# Patient Record
(demographics unavailable — no encounter records)

---

## 2024-11-05 NOTE — DATA REVIEWED
[FreeTextEntry1] : GAEL - TBI - diminished TBI bl  Left lower extremity arterial duplex - SFA stenosis, runoff via peroneal

## 2024-11-05 NOTE — ASSESSMENT
[FreeTextEntry1] : 91 yo female with a nonhealing wound to the left lower leg. The wound has not healed despite local wound care. The patient has no palpable popliteal or pedal pulses on exam. She underwent arterial testing which demonstrated an SFA stenosis and diminished TBI (GAEL could not be performed secondary to location of the wound). I believe she would benefit from an angiogram and possible intervention. The risks and benefits were discussed with the patient and her daughter, including infection, bleeding and renal insufficiency. I also think she would benefit from increased compression and she was instructed to wear an ace wrap daily.  Will schedule for procedure. Continue current workup.

## 2024-11-05 NOTE — PHYSICAL EXAM
[JVD] : no jugular venous distention  [Normal Breath Sounds] : Normal breath sounds [Normal Rate and Rhythm] : normal rate and rhythm [2+] : left 2+ [1+] : right 1+ [0] : left 0 [Ankle Swelling (On Exam)] : present [Ankle Swelling On The Left] : of the left ankle [Ankle Swelling On The Right] : mild [Alert] : alert [Oriented to Person] : oriented to person [Oriented to Place] : oriented to place [Oriented to Time] : oriented to time [de-identified] : Awake and Alert [de-identified] : medial malleolus ulcer - large defect [de-identified] : Appropriate

## 2024-11-05 NOTE — REASON FOR VISIT
[Initial Evaluation] : an initial evaluation [FreeTextEntry1] : nonhealing wound left lower extremity

## 2024-11-05 NOTE — HISTORY OF PRESENT ILLNESS
[FreeTextEntry1] : 91 yo female referred by Dr. Null for a nonhealing wound to the left lower extremity. The patient reports that she developed a wound to the left leg in August. She reports that she has been undergoing local wound care with wet to dry dressings and tubigrip compression at the [facility]. She reports that the wound is not progressing. She was referred for a vascular evaluation. She is on Coumadin for atrial fibrillation.

## 2024-12-16 NOTE — REVIEW OF SYSTEMS
[Lower Ext Edema] : lower extremity edema [Limb Swelling] : limb swelling [Skin Wound] : skin wound [Fever] : no fever [Chills] : no chills [Limb Pain] : no limb pain

## 2024-12-16 NOTE — END OF VISIT
[FreeTextEntry3] :  All medical record entries made by the katelinibe were at NITA hanson KENNETH, direction and personally dictated by me on 12/16/2024. I have reviewed the chart and agree that the record accurately reflects my personal performance of the history, physical exam, assessment, and plan. I have also personally directed, reviewed, and agreed with the chart.

## 2024-12-16 NOTE — ASSESSMENT
[FreeTextEntry1] : 93 yo female returns in follow up. She is s/p LLE angiogram and intervention on 12/6 for PAD with nonhealing wound. Patient is doing well post procedure. Her wound is now clean and granulating and is slowly improving   I am hopeful the wound would heal in the upcoming weeks. She will continue local wound care. I think she would benefit from increased compression, and she was instructed to wear an ace wrap daily.  Patient was instructed to continue to follow up with Dr. Null at the wound care clinic weekly.. She will follow up in 3 weeks for a  wound check. If the wound does not continue to improve she will require a repeat angiogram vs a bypass.   She requested to stop taking Aspirin secondary to   frequent bruising. Continue Coumadin.

## 2024-12-16 NOTE — HISTORY OF PRESENT ILLNESS
[FreeTextEntry1] : 91 yo female referred by Dr. Null for a nonhealing wound to the left lower extremity. The patient reports that she developed a wound to the left leg in August. She reports that she has been undergoing local wound care with wet to dry dressings and tubigrip compression at the [facility]. She reports that the wound is not progressing. She was referred for a vascular evaluation. She is on Coumadin for atrial fibrillation.  [de-identified] : 92 year old female returns in follow up. She is s/p LLE angiogram and intervention on 12/6 for PAD with nonhealing wound. I was unable to cross her occluded popliteal artery but did perform a PTA of her severely diseased SFA. Patient reports that she is doing well post procedure. She reports that the wound is now improving. She is undergoing local wound care with wet to dry dressings and tubigrip compression. Patient continues to have swelling of the LLE. Daughter reports patient gets frequent bruising. She presents for a post-op check  She is on Aspirin and Coumadin.

## 2024-12-16 NOTE — PHYSICAL EXAM
[Normal Breath Sounds] : Normal breath sounds [Normal Rate and Rhythm] : normal rate and rhythm [2+] : left 2+ [1+] : right 1+ [Ankle Swelling (On Exam)] : present [Ankle Swelling On The Left] : of the left ankle [Ankle Swelling On The Right] : mild [Alert] : alert [Oriented to Person] : oriented to person [Oriented to Place] : oriented to place [Oriented to Time] : oriented to time [0] : left 0 [JVD] : no jugular venous distention  [de-identified] : Awake and Alert [FreeTextEntry1] : monophasic peroneal signal [de-identified] : medial malleolus ulcer - improving - now granulating [de-identified] :  No gross motor or sensory deficits. [de-identified] : Appropriate

## 2025-01-13 NOTE — ASSESSMENT
[FreeTextEntry1] : 91 yo female returns in follow up. She is s/p LLE angiogram and intervention on 12/6 for PAD with nonhealing wound. Patient is doing well post procedure. Her wound continues to improve and is almost healed. She will continue local wound care. and compression therapy.  Patient was instructed to continue to follow up with Dr. Null at the wound care clinic. She will follow up with me if her wound deteriorates.    Continue Coumadin.

## 2025-01-13 NOTE — PHYSICAL EXAM
[JVD] : no jugular venous distention  [Normal Breath Sounds] : Normal breath sounds [Normal Rate and Rhythm] : normal rate and rhythm [2+] : left 2+ [0] : right 0 [1+] : right 1+ [Ankle Swelling (On Exam)] : not present [Ankle Swelling On The Left] : of the left ankle [Alert] : alert [Oriented to Person] : oriented to person [Oriented to Place] : oriented to place [Oriented to Time] : oriented to time [de-identified] : Awake and Alert [FreeTextEntry1] : monophasic peroneal and DP signal [de-identified] : medial malleolus ulcer - improving -almost healed [de-identified] : Appropriate [de-identified] :  No gross motor or sensory deficits. no

## 2025-01-13 NOTE — HISTORY OF PRESENT ILLNESS
[FreeTextEntry1] : 93 yo female referred by Dr. Null for a nonhealing wound to the left lower extremity. The patient reports that she developed a wound to the left leg in August. She reports that she has been undergoing local wound care with wet to dry dressings and tubigrip compression at the [facility]. She reports that the wound is not progressing. She was referred for a vascular evaluation. She is on Coumadin for atrial fibrillation.  [de-identified] : 92 year old female returns in follow up. She is s/p LLE angiogram and intervention on 12/6 for PAD for a nonhealing wound. I was unable to cross her occluded popliteal artery but did perform a PTA of her severely diseased SFA. Patient reports that she continues to do well post procedure. She reports that the wound is almost healed. She is undergoing local wound care with Maxorb and tubigrip compression.  She presents for a post-op check  She is on Coumadin.

## 2025-06-09 NOTE — END OF VISIT
[FreeTextEntry3] :  All medical record entries made by the katelinibe were at myNITA KENNETH, direction and personally dictated by me on 6/9/2025. I have reviewed the chart and agree that the record accurately reflects my personal performance of the history, physical exam, assessment, and plan. I have also personally directed, reviewed, and agreed with the chart.

## 2025-06-09 NOTE — ASSESSMENT
[FreeTextEntry1] : 92 yo female returns in follow up for a recurrent  nonhealing wound to the LLE. She developed a recurrent nonhealing wound to the left lower extremity with associated pain a month ago. She reports that the wound is not progressing despite local wound care. She underwent a lower extremity arterial duplex which demonstrated an SFA and popliteal occlusion. I believe she would benefit from a repeat angiogram and possible intervention. The risks and benefits were discussed with the patient and her daughter, including infection, bleeding and renal insufficiency. Will schedule for procedure. Continue current workup.   She will need to stop Coumadin 5 days prior to the procedure.

## 2025-06-09 NOTE — PHYSICAL EXAM
[Normal Breath Sounds] : Normal breath sounds [Normal Rate and Rhythm] : normal rate and rhythm [2+] : left 2+ [0] : right 0 [Ankle Swelling (On Exam)] : present [Ankle Swelling On The Right] : mild [Ankle Swelling On The Left] : of the left ankle [Alert] : alert [Oriented to Person] : oriented to person [Oriented to Place] : oriented to place [Oriented to Time] : oriented to time [JVD] : no jugular venous distention  [de-identified] : Awake and Alert [de-identified] : recurrent wound to the LLE-large defect, macerated, 75% granulating [de-identified] :  No gross motor or sensory deficits. [de-identified] : Appropriate

## 2025-06-09 NOTE — DATA REVIEWED
[FreeTextEntry1] : Arterial duplex- left- personally reviewed - common femoral and deep femoral arteries are patent. Distal femoral to popliteal artery appear occluded with just below knee reconstitution. The PT, peroneal, and anterior tibial arteries are highly calcified with low monophasic flow. The DP artery is patent.

## 2025-06-09 NOTE — HISTORY OF PRESENT ILLNESS
[FreeTextEntry1] : 93 yo female referred by Dr. Null for a nonhealing wound to the left lower extremity. The patient reports that she developed a wound to the left leg in August. She reports that she has been undergoing local wound care with wet to dry dressings and tubigrip compression at the [facility]. She reports that the wound is not progressing. She was referred for a vascular evaluation. She is on Coumadin for atrial fibrillation.  [de-identified] : 93 year old female returns for non-healing LLE wound. She is s/p LLE angiogram and intervention on 12/6 for PAD for a nonhealing wound. I was unable to cross her occluded popliteal artery but did perform a PTA of her severely diseased SFA. Her ulcer healed post procedure. Approximately 1 month ago she underwent a recurrent ulcer. She has been followed by Dr. Huerta with little improvement.  She reports significant pain associated with the ulcer. She was started on Gabapentin with minimal relief. She is s/p an arterial duplex and presents to discuss the results and additional treatment options.  She is on Coumadin for atrial fibrillation.

## 2025-06-09 NOTE — REVIEW OF SYSTEMS
Lot #: [id number] Lot #: [id number] [Skin Wound] : skin wound [Fever] : no fever [Chills] : no chills [Lower Ext Edema] : no lower extremity edema [Limb Pain] : limb pain [Limb Swelling] : no limb swelling

## 2025-07-09 NOTE — ASSESSMENT
[FreeTextEntry1] : 92 yo female returns in follow up for a recurrent  nonhealing wound to the E. She is now s/pa  left lower extremity  angioplasty and stenting of  the superficial femoral and popliteal artery. She has a biphasic peroneal doppler signal. The wound is smaller in size since the last visit. It is clean and granulating. The patient will follow up in 2 weeks for a wound check sooner if she develops a problem. Continue local wound care as per by Dr. Null. Continue Coumadin and aspirin.

## 2025-07-09 NOTE — REVIEW OF SYSTEMS
[Skin Wound] : skin wound [Fever] : no fever [Chills] : no chills [Lower Ext Edema] : no lower extremity edema [Limb Pain] : no limb pain [Limb Swelling] : no limb swelling

## 2025-07-09 NOTE — END OF VISIT
[FreeTextEntry3] :  All medical record entries made by the katelinibe were at myNITA KENNETH, direction and personally dictated by me on 7/9/2025. I have reviewed the chart and agree that the record accurately reflects my personal performance of the history, physical exam, assessment, and plan. I have also personally directed, reviewed, and agreed with the chart.

## 2025-07-09 NOTE — HISTORY OF PRESENT ILLNESS
[FreeTextEntry1] : 91 yo female referred by Dr. Null for a nonhealing wound to the left lower extremity. The patient reports that she developed a wound to the left leg in August. She reports that she has been undergoing local wound care with wet to dry dressings and tubigrip compression at the [facility]. She reports that the wound is not progressing. She was referred for a vascular evaluation. She is on Coumadin for atrial fibrillation.  [de-identified] : 93 year old female returns in follow up for a non-healing LLE wound. She is s/p LLE angiogram and intervention on 12/6 for PAD for a nonhealing wound. I was unable to cross her occluded popliteal artery but did perform a PTA of her severely diseased SFA. Her ulcer healed post procedure. She developed a new ulceration and is now s/p a left lower extremity angiogram, angioplasty and stenting of left superficial femoral and popliteal arteries. The patient states that the wound has decreased in size since the procedure.  Her pain has resolved. She is here for a post operative evaluation. She has resumed her Coumadin for atrial fibrillation and is on aspirin.

## 2025-07-09 NOTE — PHYSICAL EXAM
[Normal Breath Sounds] : Normal breath sounds [Normal Rate and Rhythm] : normal rate and rhythm [2+] : left 2+ [Ankle Swelling (On Exam)] : present [Ankle Swelling On The Left] : of the left ankle [Ankle Swelling On The Right] : mild [Alert] : alert [Oriented to Person] : oriented to person [Oriented to Place] : oriented to place [Oriented to Time] : oriented to time [JVD] : no jugular venous distention  [de-identified] : Awake and Alert [FreeTextEntry1] : biphasic peroneal signal [de-identified] : no right groin hematoma, recurrent wound to the LLE- clean and granulating, mild edema of the left foot.  [de-identified] :  No gross motor or sensory deficits. [de-identified] : Appropriate

## 2025-07-14 NOTE — HISTORY OF PRESENT ILLNESS
[de-identified] : ANITA TREVIÑO is a 93 year old female with a PMH of PAD, hyperlipidemia, asthma, atrial fibrillation, hypertension who presents to the office today at the request of Dr. Kraus for neurosurgical consultation due to T12 fracture.  The pain is characterized as aching in nature.  It started June 2025 while reaching to get in a Jeep. It is exacerbated by transitions and relieved by rest.  It radiates midline lower back initially now just off midline to left. There has been no known trauma precipitating the pain.  The patient denies numbness of extremities, weakness of extremities, bowel or bladder incontinence and gait disturbance.  She has tried pain medications including Tramadol 25 mg in the last month with relief.  She has undergone imaging in the form of CT lumbar spine which revealed acute compression deformity of T12. T11 compression deformity. At T11-T12, there is disc herniation suspected with moderate spinal stenosis and potentially related to prior T11 fracture.  (see detailed report below) She has been using the cane for one year.  Surg Hx: bladder surgery, tonsillectomy, angioplasty x2 with Dr. Kraus, megan in forearm   Meds:  amlodipine, benazepril, aspirin 81 mg, bisoprolol, plavix, crestor, losartan, montelukast, rosuvastatin, singulair, lasix, tramadol, Vitamin D   Allergies: PCN  Soc Hx: nonsmoker, one glass EtOH, lives alone

## 2025-07-14 NOTE — ASSESSMENT
[FreeTextEntry1] : ANITA TREVIÑO is a 93 year old female with a PMH of PAD, hyperlipidemia, asthma, hypertension who presents to the office today at the request of Dr. Kraus for neurosurgical consultation due to T12 fracture.  I reviewed her CT lumbar spine in the office today. There is extensive scoliosis seen, as well as a chronic appearing compression fracture of the T11 vertebral body. There is an acute fracture at T12 without any significant loss of vertebral body height, retropulsion, or kyphosis.  We will have her obtained standing x-rays for baseline imaging and have provided her with a prescription to obtain an LSO brace.  I will plan to see her back in 8 more weeks with repeat upright X-rays to follow the development of the fracture healing process and to ensure she maintains her overall global alignment.  We discussed the overall importance of prevention of further fractures including obtaining dedicated bone density studies and potential treatments based on results.  She will discuss this with her primary care doctor who can order the studies and treat based on results and endocrinology can be available as needed.  I have also provided her with a prescription for physical therapy as well as information regarding possible home physical therapy  I spent 60 minutes relative to this patient encounter.

## 2025-07-14 NOTE — HISTORY OF PRESENT ILLNESS
[de-identified] : ANITA TREVIÑO is a 93 year old female with a PMH of PAD, hyperlipidemia, asthma, atrial fibrillation, hypertension who presents to the office today at the request of Dr. Kraus for neurosurgical consultation due to T12 fracture.  The pain is characterized as aching in nature.  It started June 2025 while reaching to get in a Jeep. It is exacerbated by transitions and relieved by rest.  It radiates midline lower back initially now just off midline to left. There has been no known trauma precipitating the pain.  The patient denies numbness of extremities, weakness of extremities, bowel or bladder incontinence and gait disturbance.  She has tried pain medications including Tramadol 25 mg in the last month with relief.  She has undergone imaging in the form of CT lumbar spine which revealed acute compression deformity of T12. T11 compression deformity. At T11-T12, there is disc herniation suspected with moderate spinal stenosis and potentially related to prior T11 fracture.  (see detailed report below) She has been using the cane for one year.  Surg Hx: bladder surgery, tonsillectomy, angioplasty x2 with Dr. Kraus, megan in forearm   Meds:  amlodipine, benazepril, aspirin 81 mg, bisoprolol, plavix, crestor, losartan, montelukast, rosuvastatin, singulair, lasix, tramadol, Vitamin D   Allergies: PCN  Soc Hx: nonsmoker, one glass EtOH, lives alone

## 2025-07-14 NOTE — PHYSICAL EXAM
[General Appearance - Alert] : alert [General Appearance - In No Acute Distress] : in no acute distress [Fluency] : fluency intact [Comprehension] : comprehension intact [Cranial Nerves Optic (II)] : visual acuity intact bilaterally,  pupils equal round and reactive to light [Cranial Nerves Trigeminal (V)] : facial sensation intact symmetrically [Cranial Nerves Oculomotor (III)] : extraocular motion intact [Cranial Nerves Facial (VII)] : face symmetrical [Cranial Nerves Vestibulocochlear (VIII)] : hearing was intact bilaterally [Cranial Nerves Glossopharyngeal (IX)] : tongue and palate midline [Cranial Nerves Accessory (XI - Cranial And Spinal)] : head turning and shoulder shrug symmetric [Cranial Nerves Hypoglossal (XII)] : there was no tongue deviation with protrusion [Motor Strength] : muscle strength was normal in all four extremities [Sensation Tactile Decrease] : light touch was intact [Abnormal Walk] : normal gait [1+] : Ankle jerk left 1+ [FreeTextEntry8] : uses cane  [FreeTextEntry1] : lower extremity wound

## 2025-07-21 NOTE — HISTORY OF PRESENT ILLNESS
[FreeTextEntry1] : 91 yo female referred by Dr. Null for a nonhealing wound to the left lower extremity. The patient reports that she developed a wound to the left leg in August. She reports that she has been undergoing local wound care with wet to dry dressings and tubigrip compression at the [facility]. She reports that the wound is not progressing. She was referred for a vascular evaluation. She is on Coumadin for atrial fibrillation.  [de-identified] : 93 year old female returns in follow up for a non-healing LLE wound. She is s/p LLE angiogram and intervention on 12/6 for PAD for a nonhealing wound. I was unable to cross her occluded popliteal artery but did perform a PTA of her severely diseased SFA. Her ulcer healed post procedure. She developed a new ulceration and is now s/p a left lower extremity angiogram, angioplasty and stenting of left superficial femoral and popliteal arteries. The patient states that the wound has decreased in size since the procedure.  Her pain has resolved. She is here for a wound check. She continues to follow up with Dr. Huerta in the [facility] wound care clinic.  She has resumed her Coumadin for atrial fibrillation and is on aspirin.

## 2025-07-21 NOTE — END OF VISIT
[FreeTextEntry3] :  All medical record entries made by the katelinibe were at myNITA KENNETH, direction and personally dictated by me on 7/21/2025. I have reviewed the chart and agree that the record accurately reflects my personal performance of the history, physical exam, assessment, and plan. I have also personally directed, reviewed, and agreed with the chart.

## 2025-07-21 NOTE — PHYSICAL EXAM
[Normal Breath Sounds] : Normal breath sounds [Normal Rate and Rhythm] : normal rate and rhythm [Ankle Swelling On The Left] : of the left ankle [Alert] : alert [Oriented to Person] : oriented to person [Oriented to Place] : oriented to place [Oriented to Time] : oriented to time [JVD] : no jugular venous distention  [2+] : left 2+ [Ankle Swelling (On Exam)] : not present [de-identified] : Awake and Alert [FreeTextEntry1] : biphasic DP signal [de-identified] : recurrent wound to the LLE- clean and granulating - smaller in size, more superficial [de-identified] :  No gross motor or sensory deficits. [de-identified] : Appropriate

## 2025-07-21 NOTE — ASSESSMENT
[FreeTextEntry1] : 92 yo female returns in follow up for a recurrent nonhealing wound to the [facility]. She is now s/p a left lower extremity angioplasty and stenting of the superficial femoral and popliteal artery. She has a biphasic DP doppler signal. The wound is smaller and more superficial in size since the last visit. It is clean and granulating. The patient will follow up in 2 weeks for a wound check sooner if she develops a problem. Continue local wound care as per by Dr. Null. Continue Coumadin and aspirin.